# Patient Record
Sex: MALE | Race: WHITE | NOT HISPANIC OR LATINO | Employment: FULL TIME | URBAN - METROPOLITAN AREA
[De-identification: names, ages, dates, MRNs, and addresses within clinical notes are randomized per-mention and may not be internally consistent; named-entity substitution may affect disease eponyms.]

---

## 2018-04-02 ENCOUNTER — OFFICE VISIT (OUTPATIENT)
Dept: INTERNAL MEDICINE | Facility: CLINIC | Age: 28
End: 2018-04-02

## 2018-04-02 VITALS
DIASTOLIC BLOOD PRESSURE: 76 MMHG | TEMPERATURE: 98.7 F | OXYGEN SATURATION: 98 % | HEIGHT: 70 IN | BODY MASS INDEX: 28.35 KG/M2 | RESPIRATION RATE: 14 BRPM | SYSTOLIC BLOOD PRESSURE: 126 MMHG | WEIGHT: 198 LBS | HEART RATE: 90 BPM

## 2018-04-02 DIAGNOSIS — Z13.29 SCREENING FOR HYPOTHYROIDISM: ICD-10-CM

## 2018-04-02 DIAGNOSIS — R68.82 LOW LIBIDO: ICD-10-CM

## 2018-04-02 DIAGNOSIS — Z13.1 SCREENING FOR DIABETES MELLITUS: ICD-10-CM

## 2018-04-02 DIAGNOSIS — L28.0 LICHENIFICATION: ICD-10-CM

## 2018-04-02 DIAGNOSIS — L30.9 ECZEMA, UNSPECIFIED TYPE: Primary | ICD-10-CM

## 2018-04-02 DIAGNOSIS — F17.229 CHEWING TOBACCO NICOTINE DEPENDENCE WITH NICOTINE-INDUCED DISORDER: ICD-10-CM

## 2018-04-02 DIAGNOSIS — Z13.220 SCREENING FOR HYPERLIPIDEMIA: ICD-10-CM

## 2018-04-02 DIAGNOSIS — K13.21 LEUKOPLAKIA OF ORAL CAVITY: ICD-10-CM

## 2018-04-02 PROBLEM — M41.9 SCOLIOSIS: Status: ACTIVE | Noted: 2018-04-02

## 2018-04-02 PROCEDURE — 99215 OFFICE O/P EST HI 40 MIN: CPT | Performed by: INTERNAL MEDICINE

## 2018-04-02 RX ORDER — CLOTRIMAZOLE AND BETAMETHASONE DIPROPIONATE 10; .64 MG/G; MG/G
CREAM TOPICAL 2 TIMES DAILY
Qty: 45 G | Refills: 1 | Status: SHIPPED | OUTPATIENT
Start: 2018-04-02 | End: 2019-08-01

## 2018-04-02 RX ORDER — VARENICLINE TARTRATE 1 MG/1
1 TABLET, FILM COATED ORAL 2 TIMES DAILY
Qty: 60 TABLET | Refills: 3 | Status: SHIPPED | OUTPATIENT
Start: 2018-04-02 | End: 2018-06-04

## 2018-04-02 NOTE — PROGRESS NOTES
Brenton Parmar is a 27 y.o. male, who presents with a chief complaint of   Chief Complaint   Patient presents with   • Establish Care   • Rash     bilateral legs, 2 x years, patient states R leg worse        28 yo M here to establish care. All of his problems are new to me.    2 years ago developed a rash on his right medial leg that moved to his left leg. It is red and itchy.He has used lotion in a pump that did not help. Rubbing alcohol dried it up. He has not tried any creams. Worse in the summer. Worse wearing high boots with sweat.     He has been going to chiropractor for 1 year for low back pain and has leg numbness and pain in the left greater than the right. He does have scoliosis. He has episodes where it flairs and goes to chiropractor rand it's better. He has never gone to PT for his back. No injury to his back. No leg weakness. Taking Ibuprofen helps.     He also had low libido that has been going on for some time. No issues with erection or orgasms. No decrease in testicle size or penis size. He sleeps well. Works at [employer]. No significant caffeine or alcohol use. Overweight, but doesn't exercise regularly.    He has been using Snuff since he was 13 years old. He has changed in his mouth related to use. Wants to consider quitting. Getting  in one year and wants to quit before wedding.           The following portions of the patient's history were reviewed and updated as appropriate: allergies, current medications, past family history, past medical history, past social history, past surgical history and problem list.    Allergies: Review of patient's allergies indicates no known allergies.    Current Outpatient Prescriptions:   •  clotrimazole-betamethasone (LOTRISONE) 1-0.05 % cream, Apply  topically 2 (Two) Times a Day., Disp: 45 g, Rfl: 1  •  varenicline (CHANTIX CONTINUING MONTH PAK) 1 MG tablet, Take 1 tablet by mouth 2 (Two) Times a Day., Disp: 60 tablet, Rfl: 3  •  varenicline (CHANTIX  "STARTING MONTH PAK) 0.5 MG X 11 & 1 MG X 42 tablet, Take 0.5 mg one daily on days 1-3 and and 0.5 mg twice daily on days 4-7.Then 1 mg twice daily for a total of 12 weeks., Disp: 53 tablet, Rfl: 0  There are no discontinued medications.    Review of Systems   Constitutional: Negative for chills, fatigue and fever.   HENT: Positive for mouth sores (changes from dipping ). Negative for congestion.    Respiratory: Negative for cough and shortness of breath.    Cardiovascular: Negative for chest pain and palpitations.   Gastrointestinal: Negative for diarrhea, nausea and vomiting.   Genitourinary: Negative for difficulty urinating, dysuria and testicular pain.   Musculoskeletal: Positive for back pain.   Skin: Positive for rash.   Neurological: Negative for dizziness and headaches.   Psychiatric/Behavioral: Negative for agitation, behavioral problems and decreased concentration.             /76 (BP Location: Left arm, Patient Position: Sitting, Cuff Size: Adult)   Pulse 90   Temp 98.7 °F (37.1 °C) (Oral)   Resp 14   Ht 177.8 cm (70\")   Wt 89.8 kg (198 lb)   SpO2 98%   BMI 28.41 kg/m²       Physical Exam   Constitutional: He is oriented to person, place, and time. He appears well-developed and well-nourished. No distress.   HENT:   Head: Normocephalic and atraumatic.   Right Ear: External ear normal.   Left Ear: External ear normal.   Mouth/Throat: Oropharynx is clear and moist. Oral lesions (leukoplakia in left buccal mucosa) present. No oropharyngeal exudate.   Eyes: Conjunctivae are normal. Right eye exhibits no discharge. Left eye exhibits no discharge. No scleral icterus.   Neck: Neck supple.   Cardiovascular: Normal rate, regular rhythm and normal heart sounds.  Exam reveals no gallop and no friction rub.    No murmur heard.  Pulmonary/Chest: Effort normal and breath sounds normal. No respiratory distress. He has no wheezes. He has no rales.   Musculoskeletal: He exhibits no edema or tenderness. "   Lymphadenopathy:     He has no cervical adenopathy.   Neurological: He is alert and oriented to person, place, and time.   Skin: Skin is warm. Rash (eczema changes to right medial lower leg with lichenifcation) noted.   Psychiatric: He has a normal mood and affect. His behavior is normal.   Nursing note and vitals reviewed.        No results found for this or any previous visit.        Brenton was seen today for establish care and rash.    Diagnoses and all orders for this visit:    Eczema, unspecified type    Lichenification    Low libido  -     CBC & Differential    Screening for diabetes mellitus  -     Comprehensive Metabolic Panel  -     Urinalysis With / Culture If Indicated - Urine, Clean Catch    Screening for hyperlipidemia  -     Lipid Panel With LDL / HDL Ratio    Screening for hypothyroidism  -     TSH  -     T4, Free    Chewing tobacco nicotine dependence with nicotine-induced disorder    Leukoplakia of oral cavity    Other orders  -     varenicline (CHANTIX STARTING MONTH HU) 0.5 MG X 11 & 1 MG X 42 tablet; Take 0.5 mg one daily on days 1-3 and and 0.5 mg twice daily on days 4-7.Then 1 mg twice daily for a total of 12 weeks.  -     varenicline (CHANTIX CONTINUING MONTH HU) 1 MG tablet; Take 1 tablet by mouth 2 (Two) Times a Day.  -     clotrimazole-betamethasone (LOTRISONE) 1-0.05 % cream; Apply  topically 2 (Two) Times a Day.      Will treat Eczema with Lotrisone as it is getting worse with heat and warm weather to ensure that yeast is not worsening     Spent 5 minutes in counseling on dipping use and will start Chantix. See back for follow up in late May to see how he is doing. He has leukoplakia on his exam and encouraged him to stop dipping as he does have changes in his mouth that could lead to cancer. Not willing to see oral surgeon at this point, but if continues after quitting, will refer. Told him that I would only wait 6 months for this to resolve.      I want him to work on eating  better, exercising and quitting smoking before we pursue workup for low testosterone. If continues to be an issue, will check. No risks factors for low testosterone.     Spent 50% of 40 minutes in counseling regarding his low back pain, eczema, diet and exercise, eating better and changes in his mouth that are concerning.     Labs prior to physical and will get Tdap then.     Return in about 8 weeks (around 5/28/2018) for Annual physical.    Jyoti Souza MD  04/02/2018

## 2018-05-22 LAB
ALBUMIN SERPL-MCNC: 4.3 G/DL (ref 3.5–5.2)
ALBUMIN/GLOB SERPL: 1.8 G/DL
ALP SERPL-CCNC: 95 U/L (ref 40–129)
ALT SERPL-CCNC: 32 U/L (ref 5–41)
APPEARANCE UR: CLEAR
AST SERPL-CCNC: 24 U/L (ref 5–40)
BACTERIA #/AREA URNS HPF: NORMAL /HPF
BASOPHILS # BLD AUTO: 0.06 10*3/MM3 (ref 0–0.2)
BASOPHILS NFR BLD AUTO: 1 % (ref 0–2)
BILIRUB SERPL-MCNC: 0.2 MG/DL (ref 0.2–1.2)
BILIRUB UR QL STRIP: NEGATIVE
BUN SERPL-MCNC: 11 MG/DL (ref 6–20)
BUN/CREAT SERPL: 13.1 (ref 7–25)
CALCIUM SERPL-MCNC: 10.4 MG/DL (ref 8.6–10.5)
CHLORIDE SERPL-SCNC: 104 MMOL/L (ref 98–107)
CHOLEST SERPL-MCNC: 216 MG/DL (ref 0–200)
CO2 SERPL-SCNC: 27.8 MMOL/L (ref 22–29)
COLOR UR: YELLOW
CREAT SERPL-MCNC: 0.84 MG/DL (ref 0.76–1.27)
EOSINOPHIL # BLD AUTO: 0.45 10*3/MM3 (ref 0.1–0.3)
EOSINOPHIL NFR BLD AUTO: 7.3 % (ref 0–4)
EPI CELLS #/AREA URNS HPF: NORMAL /HPF
ERYTHROCYTE [DISTWIDTH] IN BLOOD BY AUTOMATED COUNT: 12.6 % (ref 11.5–14.5)
GFR SERPLBLD CREATININE-BSD FMLA CKD-EPI: 109 ML/MIN/1.73
GFR SERPLBLD CREATININE-BSD FMLA CKD-EPI: 132 ML/MIN/1.73
GLOBULIN SER CALC-MCNC: 2.4 GM/DL
GLUCOSE SERPL-MCNC: 107 MG/DL (ref 65–99)
GLUCOSE UR QL: NEGATIVE
HCT VFR BLD AUTO: 47.7 % (ref 42–52)
HDLC SERPL-MCNC: 47 MG/DL (ref 40–60)
HGB BLD-MCNC: 15.7 G/DL (ref 14–18)
HGB UR QL STRIP: NEGATIVE
IMM GRANULOCYTES # BLD: 0.02 10*3/MM3 (ref 0–0.03)
IMM GRANULOCYTES NFR BLD: 0.3 % (ref 0–0.5)
KETONES UR QL STRIP: NEGATIVE
LDLC SERPL CALC-MCNC: 145 MG/DL (ref 0–100)
LDLC/HDLC SERPL: 3.09 {RATIO}
LEUKOCYTE ESTERASE UR QL STRIP: NEGATIVE
LYMPHOCYTES # BLD AUTO: 2.49 10*3/MM3 (ref 0.6–4.8)
LYMPHOCYTES NFR BLD AUTO: 40.2 % (ref 20–45)
MCH RBC QN AUTO: 29.9 PG (ref 27–31)
MCHC RBC AUTO-ENTMCNC: 32.9 G/DL (ref 31–37)
MCV RBC AUTO: 90.9 FL (ref 80–94)
MICRO URNS: NORMAL
MICRO URNS: NORMAL
MONOCYTES # BLD AUTO: 0.51 10*3/MM3 (ref 0–1)
MONOCYTES NFR BLD AUTO: 8.2 % (ref 3–8)
MUCOUS THREADS URNS QL MICRO: PRESENT /HPF
NEUTROPHILS # BLD AUTO: 2.67 10*3/MM3 (ref 1.5–8.3)
NEUTROPHILS NFR BLD AUTO: 43 % (ref 45–70)
NITRITE UR QL STRIP: NEGATIVE
NRBC BLD AUTO-RTO: 0 /100 WBC (ref 0–0)
PH UR STRIP: 5.5 [PH] (ref 5–7.5)
PLATELET # BLD AUTO: 319 10*3/MM3 (ref 140–500)
POTASSIUM SERPL-SCNC: 5 MMOL/L (ref 3.5–5.2)
PROT SERPL-MCNC: 6.7 G/DL (ref 6–8.5)
PROT UR QL STRIP: NEGATIVE
RBC # BLD AUTO: 5.25 10*6/MM3 (ref 4.7–6.1)
RBC #/AREA URNS HPF: NORMAL /HPF
SODIUM SERPL-SCNC: 142 MMOL/L (ref 136–145)
SP GR UR: 1.02 (ref 1–1.03)
T4 FREE SERPL-MCNC: 1.2 NG/DL (ref 0.93–1.7)
TRIGL SERPL-MCNC: 119 MG/DL (ref 0–150)
TSH SERPL DL<=0.005 MIU/L-ACNC: 1.31 MIU/ML (ref 0.27–4.2)
URINALYSIS REFLEX: NORMAL
UROBILINOGEN UR STRIP-MCNC: 0.2 MG/DL (ref 0.2–1)
VLDLC SERPL CALC-MCNC: 23.8 MG/DL (ref 8–32)
WBC # BLD AUTO: 6.2 10*3/MM3 (ref 4.8–10.8)
WBC #/AREA URNS HPF: NORMAL /HPF

## 2018-06-04 ENCOUNTER — OFFICE VISIT (OUTPATIENT)
Dept: INTERNAL MEDICINE | Facility: CLINIC | Age: 28
End: 2018-06-04

## 2018-06-04 VITALS
SYSTOLIC BLOOD PRESSURE: 122 MMHG | OXYGEN SATURATION: 98 % | HEIGHT: 70 IN | BODY MASS INDEX: 28.75 KG/M2 | DIASTOLIC BLOOD PRESSURE: 78 MMHG | HEART RATE: 89 BPM | WEIGHT: 200.8 LBS | RESPIRATION RATE: 16 BRPM

## 2018-06-04 DIAGNOSIS — L30.9 ECZEMA, UNSPECIFIED TYPE: ICD-10-CM

## 2018-06-04 DIAGNOSIS — Z00.00 ENCOUNTER FOR ANNUAL PHYSICAL EXAM: Primary | ICD-10-CM

## 2018-06-04 DIAGNOSIS — R73.01 IFG (IMPAIRED FASTING GLUCOSE): ICD-10-CM

## 2018-06-04 DIAGNOSIS — E78.00 PURE HYPERCHOLESTEROLEMIA: ICD-10-CM

## 2018-06-04 DIAGNOSIS — L28.0 LICHENIFICATION: ICD-10-CM

## 2018-06-04 PROCEDURE — 93000 ELECTROCARDIOGRAM COMPLETE: CPT | Performed by: INTERNAL MEDICINE

## 2018-06-04 PROCEDURE — 99395 PREV VISIT EST AGE 18-39: CPT | Performed by: INTERNAL MEDICINE

## 2018-06-04 PROCEDURE — 90715 TDAP VACCINE 7 YRS/> IM: CPT | Performed by: INTERNAL MEDICINE

## 2018-06-04 PROCEDURE — 90471 IMMUNIZATION ADMIN: CPT | Performed by: INTERNAL MEDICINE

## 2018-06-04 PROCEDURE — 99213 OFFICE O/P EST LOW 20 MIN: CPT | Performed by: INTERNAL MEDICINE

## 2018-06-04 NOTE — PROGRESS NOTES
Patient Name: Brenton Farris is a 28 y.o. male presenting for Annual Exam (CPE,  lab results )    He has not dipped since May 5th and is doing well with this. He is eating sunflower seeds, candies, gums , etc that has helped. Took chantix for 4 weeks and then quit because he didn't need it anymore.     He has HLD that is new. He is not on medication and doesn't eat a good diet all the time.  Eats hamburgers regularly. Not exercising regularly either.     He has IFG and this is a new diagnosis. Doesn't drink sodas regularly. He doesn't watch what he eats. He does drink a few beers per night.     Well Adult Physical   Patient here for a comprehensive physical exam.The patient reports problems - IFG, HLD, chewing tobacco     Do you take any herbs or supplements that were not prescribed by a doctor? no   Are you taking calcium supplements? no   Are you taking aspirin daily? no      Brenton Parmar 28 y.o. male who presents for an Annual Wellness Visit.  he has a history of   Patient Active Problem List   Diagnosis   • Eczema   • Lichenification   • Low libido   • Leukoplakia of oral cavity   • Scoliosis       Health Habits:  Dental Exam. not up to date - needs to schedule   Eye Exam. not up to date - needs to schedule  Exercise: 0 times/week.  Current exercise activities include: not any formal exercise outside of work     Tob use: no longer dipping   Qualifies for lung Ca screening?N/A       The following portions of the patient's history were reviewed and updated as appropriate: allergies, current medications, past family history, past medical history, past social history, past surgical history and problem list.    Review of Systems   Constitutional: Negative for chills, fatigue and fever.   HENT: Negative for congestion.    Respiratory: Negative for cough and shortness of breath.    Cardiovascular: Negative for chest pain and palpitations.   Genitourinary: Negative for difficulty urinating and  "dysuria.   Musculoskeletal: Negative for arthralgias.   Skin: Positive for rash.   Neurological: Negative for headaches.   Hematological: Negative for adenopathy.       Patient has no known allergies.      Current Outpatient Prescriptions:   •  clotrimazole-betamethasone (LOTRISONE) 1-0.05 % cream, Apply  topically 2 (Two) Times a Day., Disp: 45 g, Rfl: 1    OBJECTIVE    /78 (BP Location: Left arm, Patient Position: Sitting, Cuff Size: Adult)   Pulse 89   Resp 16   Ht 177.8 cm (70\")   Wt 91.1 kg (200 lb 12.8 oz)   SpO2 98%   BMI 28.81 kg/m²       Physical Exam   Constitutional: He is oriented to person, place, and time. Vital signs are normal. He appears well-developed and well-nourished. No distress.   HENT:   Head: Normocephalic and atraumatic.   Right Ear: Hearing, tympanic membrane, external ear and ear canal normal.   Left Ear: Hearing, tympanic membrane, external ear and ear canal normal.   Nose: Nose normal.   Mouth/Throat: Uvula is midline and mucous membranes are normal. Oral lesions (leukoplakia on left lower mouth/buccal mucosa) present. Posterior oropharyngeal edema and posterior oropharyngeal erythema present. No oropharyngeal exudate. Tonsils are 1+ on the right. Tonsils are 1+ on the left. No tonsillar exudate.   Eyes: Conjunctivae, EOM and lids are normal. Pupils are equal, round, and reactive to light. Right eye exhibits no discharge. Left eye exhibits no discharge. No scleral icterus.   Neck: Trachea normal and full passive range of motion without pain. Neck supple. Carotid bruit is not present. No thyroid mass and no thyromegaly present.   Cardiovascular: Normal rate, regular rhythm and normal heart sounds.  Exam reveals no gallop and no friction rub.    No murmur heard.  Pulmonary/Chest: Effort normal and breath sounds normal. No respiratory distress. He has no wheezes. He has no rales.   Abdominal: Soft. Bowel sounds are normal. He exhibits no distension and no mass. There is no " tenderness. There is no guarding.   Lymphadenopathy:     He has no cervical adenopathy.   Neurological: He is alert and oriented to person, place, and time.   Skin: Skin is warm. Rash noted. Rash is maculopapular (red and raised area of right inner leg with lichenfication ).   Psychiatric: He has a normal mood and affect. His behavior is normal.   Nursing note and vitals reviewed.        ECG 12 Lead  Date/Time: 6/4/2018 2:00 PM  Performed by: CAIO KIM  Authorized by: CAIO KIM   Comparison: not compared with previous ECG   Previous ECG: no previous ECG available  Rhythm: sinus rhythm  Rate: normal  Conduction: conduction normal  Conduction: non-specific intraventricular conduction delay  ST Segments: ST segments normal  T Waves: T waves normal  QRS axis: normal  Other: no other findings  Clinical impression: normal ECG          ASSESSMENT AND PLAN  Tdap given today.I want Brenton to begin a progressive daily aerobic exercise program, follow a low fat, low cholesterol diet, attempt to lose weight, decrease or avoid alcohol intake, reduce exposure to stress and improve dietary compliance.     He has new diagnosis of IFG and HLD and we discussed at length about diet and exercise and spent 50% of 25 minutes in counseling regarding this outside of physical.     He still has rash on his left leg and was not better with Lotrisone. Will send to dermatology for further evaluation as he has had this rash for the last 2 years.     Will see back with fasting labs one week before in 10/2018    Brenton was seen today for annual exam.    Diagnoses and all orders for this visit:    Encounter for annual physical exam  -     ECG 12 Lead  -     Tdap Vaccine Greater Than or Equal To 6yo IM    IFG (impaired fasting glucose)  -     Comprehensive Metabolic Panel  -     Hemoglobin A1c    Pure hypercholesterolemia  -     ECG 12 Lead  -     Comprehensive Metabolic Panel  -     Lipid Panel With LDL / HDL Ratio    Eczema,  unspecified type  -     Ambulatory Referral to Dermatology    Lichenification  -     Ambulatory Referral to Dermatology        Return in about 4 months (around 10/15/2018) for Recheck.

## 2018-06-04 NOTE — PATIENT INSTRUCTIONS
Cholesterol  Cholesterol is a fat. Your body needs a small amount of cholesterol. Cholesterol (plaque) may build up in your blood vessels (arteries). That makes you more likely to have a heart attack or stroke.  You cannot feel your cholesterol level. Having a blood test is the only way to find out if your level is high. Keep your test results. Work with your doctor to keep your cholesterol at a good level.  What do the results mean?  · Total cholesterol is how much cholesterol is in your blood.  · LDL is bad cholesterol. This is the type that can build up. Try to have low LDL.  · HDL is good cholesterol. It cleans your blood vessels and carries LDL away. Try to have high HDL.  · Triglycerides are fat that the body can store or burn for energy.  What are good levels of cholesterol?  · Total cholesterol below 200.  · LDL below 100 is good for people who have health risks. LDL below 70 is good for people who have very high risks.  · HDL above 40 is good. It is best to have HDL of 60 or higher.  · Triglycerides below 150.  How can I lower my cholesterol?  Diet   Follow your diet program as told by your doctor.  · Choose fish, white meat chicken, or turkey that is roasted or baked. Try not to eat red meat, fried foods, sausage, or lunch meats.  · Eat lots of fresh fruits and vegetables.  · Choose whole grains, beans, pasta, potatoes, and cereals.  · Choose olive oil, corn oil, or canola oil. Only use small amounts.  · Try not to eat butter, mayonnaise, shortening, or palm kernel oils.  · Try not to eat foods with trans fats.  · Choose low-fat or nonfat dairy foods.  ¨ Drink skim or nonfat milk.  ¨ Eat low-fat or nonfat yogurt and cheeses.  ¨ Try not to drink whole milk or cream.  ¨ Try not to eat ice cream, egg yolks, or full-fat cheeses.  · Healthy desserts include manfred food cake, michael snaps, animal crackers, hard candy, popsicles, and low-fat or nonfat frozen yogurt. Try not to eat pastries, cakes, pies, and  cookies.  Exercise   Follow your exercise program as told by your doctor.  · Be more active. Try gardening, walking, and taking the stairs.  · Ask your doctor about ways that you can be more active.  Medicine  · Take over-the-counter and prescription medicines only as told by your doctor.  This information is not intended to replace advice given to you by your health care provider. Make sure you discuss any questions you have with your health care provider.  Document Released: 03/16/2010 Document Revised: 07/19/2017 Document Reviewed: 06/29/2017  Elsevier Interactive Patient Education © 2017 [employer]

## 2019-08-01 ENCOUNTER — OFFICE VISIT (OUTPATIENT)
Dept: INTERNAL MEDICINE | Facility: CLINIC | Age: 29
End: 2019-08-01

## 2019-08-01 VITALS
OXYGEN SATURATION: 96 % | HEART RATE: 79 BPM | RESPIRATION RATE: 18 BRPM | WEIGHT: 200.2 LBS | SYSTOLIC BLOOD PRESSURE: 128 MMHG | BODY MASS INDEX: 28.66 KG/M2 | DIASTOLIC BLOOD PRESSURE: 70 MMHG | HEIGHT: 70 IN | TEMPERATURE: 98.1 F

## 2019-08-01 DIAGNOSIS — E78.49 OTHER HYPERLIPIDEMIA: ICD-10-CM

## 2019-08-01 DIAGNOSIS — K13.21 LEUKOPLAKIA OF ORAL CAVITY: Primary | ICD-10-CM

## 2019-08-01 DIAGNOSIS — Z13.29 SCREENING FOR HYPOTHYROIDISM: ICD-10-CM

## 2019-08-01 DIAGNOSIS — F17.229 CHEWING TOBACCO NICOTINE DEPENDENCE WITH NICOTINE-INDUCED DISORDER: ICD-10-CM

## 2019-08-01 DIAGNOSIS — R73.01 IFG (IMPAIRED FASTING GLUCOSE): ICD-10-CM

## 2019-08-01 PROCEDURE — 99214 OFFICE O/P EST MOD 30 MIN: CPT | Performed by: INTERNAL MEDICINE

## 2019-08-01 NOTE — PROGRESS NOTES
"Brenton Parmar is a 29 y.o. male, who presents with a chief complaint of   Chief Complaint   Patient presents with   • Hyperlipidemia       28 yo M here for follow up. He is still dipping and still has issues with lesions in his mouth. Has not seen dentist, but has appointment with them soon.     He is still poorly on most days. He eats ramen noodle on most days. He does have lean protein on most days. Limited fruits and vegetables. Not drinking soda or sweet tea. He drinks about 3-4 beers/day.       He still has lesion on his right lower legs. Steroid has helped some. He has appointment with dermatology on 8/18/19.     He does elevated BP's at home. No symptoms. Eats a high salt diet and drinks alcohol. No headaches or dizziness.          The following portions of the patient's history were reviewed and updated as appropriate: allergies, current medications, past family history, past medical history, past social history, past surgical history and problem list.    Allergies: Patient has no known allergies.  No current outpatient medications on file.  Medications Discontinued During This Encounter   Medication Reason   • clotrimazole-betamethasone (LOTRISONE) 1-0.05 % cream *Therapy completed       Review of Systems   Constitutional: Negative for chills and fatigue.   HENT: Negative for congestion and rhinorrhea.    Respiratory: Negative for cough and shortness of breath.    Cardiovascular: Negative for chest pain and palpitations.   Gastrointestinal: Negative for abdominal pain, constipation and diarrhea.   Neurological: Negative for dizziness and headaches.             /70   Pulse 79   Temp 98.1 °F (36.7 °C) (Oral)   Resp 18   Ht 177.8 cm (70\")   Wt 90.8 kg (200 lb 3.2 oz)   SpO2 96%   BMI 28.73 kg/m²       Physical Exam   Constitutional: He is oriented to person, place, and time. He appears well-developed and well-nourished. No distress.   HENT:   Head: Normocephalic and atraumatic.   Right Ear: " External ear normal.   Left Ear: External ear normal.   Mouth/Throat: Oropharynx is clear and moist. No oropharyngeal exudate.   Eyes: Conjunctivae are normal. Right eye exhibits no discharge. Left eye exhibits no discharge. No scleral icterus.   Neck: Neck supple.   Cardiovascular: Normal rate, regular rhythm and normal heart sounds. Exam reveals no gallop and no friction rub.   No murmur heard.  Pulmonary/Chest: Effort normal and breath sounds normal. No respiratory distress. He has no wheezes. He has no rales.   Abdominal: Soft. Bowel sounds are normal. He exhibits no distension and no mass. There is no tenderness. There is no guarding.   Lymphadenopathy:     He has no cervical adenopathy.   Neurological: He is alert and oriented to person, place, and time.   Skin: Skin is warm. No rash noted.   Psychiatric: He has a normal mood and affect. His behavior is normal.   Nursing note and vitals reviewed.        Results for orders placed or performed in visit on 04/02/18   Comprehensive Metabolic Panel   Result Value Ref Range    Glucose 107 (H) 65 - 99 mg/dL    BUN 11 6 - 20 mg/dL    Creatinine 0.84 0.76 - 1.27 mg/dL    eGFR Non African Am 109 >60 mL/min/1.73    eGFR African Am 132 >60 mL/min/1.73    BUN/Creatinine Ratio 13.1 7.0 - 25.0    Sodium 142 136 - 145 mmol/L    Potassium 5.0 3.5 - 5.2 mmol/L    Chloride 104 98 - 107 mmol/L    Total CO2 27.8 22.0 - 29.0 mmol/L    Calcium 10.4 8.6 - 10.5 mg/dL    Total Protein 6.7 6.0 - 8.5 g/dL    Albumin 4.30 3.50 - 5.20 g/dL    Globulin 2.4 gm/dL    A/G Ratio 1.8 g/dL    Total Bilirubin 0.2 0.2 - 1.2 mg/dL    Alkaline Phosphatase 95 40 - 129 U/L    AST (SGOT) 24 5 - 40 U/L    ALT (SGPT) 32 5 - 41 U/L   Lipid Panel With LDL / HDL Ratio   Result Value Ref Range    Total Cholesterol 216 (H) 0 - 200 mg/dL    Triglycerides 119 0 - 150 mg/dL    HDL Cholesterol 47 40 - 60 mg/dL    VLDL Cholesterol 23.8 8 - 32 mg/dL    LDL Cholesterol  145 (H) 0 - 100 mg/dL    LDL/HDL Ratio 3.09     Urinalysis With / Culture If Indicated - Urine, Clean Catch   Result Value Ref Range    Specific Gravity, UA 1.021 1.005 - 1.030    pH, UA 5.5 5.0 - 7.5    Color, UA Yellow Yellow    Appearance, UA Clear Clear    Leukocytes, UA Negative Negative    Protein Negative Negative/Trace    Glucose, UA Negative Negative    Ketones Negative Negative    Blood, UA Negative Negative    Bilirubin, UA Negative Negative    Urobilinogen, UA 0.2 0.2 - 1.0 mg/dL    Nitrite, UA Negative Negative    Microscopic Examination Comment     Microscopic Examination See below:     Urinalysis Reflex Comment    TSH   Result Value Ref Range    TSH 1.310 0.270 - 4.200 mIU/mL   T4, Free   Result Value Ref Range    Free T4 1.20 0.93 - 1.70 ng/dL   Microscopic Examination   Result Value Ref Range    WBC, UA 0-5 0 - 5 /hpf    RBC, UA None seen 0 - 2 /hpf    Epithelial Cells (non renal) 0-10 0 - 10 /hpf    Mucus, UA Present Not Estab. /hpf    Bacteria, UA None seen None seen/Few /hpf   CBC & Differential   Result Value Ref Range    WBC 6.20 4.80 - 10.80 10*3/mm3    RBC 5.25 4.70 - 6.10 10*6/mm3    Hemoglobin 15.7 14.0 - 18.0 g/dL    Hematocrit 47.7 42.0 - 52.0 %    MCV 90.9 80.0 - 94.0 fL    MCH 29.9 27.0 - 31.0 pg    MCHC 32.9 31.0 - 37.0 g/dL    RDW 12.6 11.5 - 14.5 %    Platelets 319 140 - 500 10*3/mm3    Neutrophil Rel % 43.0 (L) 45.0 - 70.0 %    Lymphocyte Rel % 40.2 20.0 - 45.0 %    Monocyte Rel % 8.2 (H) 3.0 - 8.0 %    Eosinophil Rel % 7.3 (H) 0.0 - 4.0 %    Basophil Rel % 1.0 0.0 - 2.0 %    Neutrophils Absolute 2.67 1.50 - 8.30 10*3/mm3    Lymphocytes Absolute 2.49 0.60 - 4.80 10*3/mm3    Monocytes Absolute 0.51 0.00 - 1.00 10*3/mm3    Eosinophils Absolute 0.45 (H) 0.10 - 0.30 10*3/mm3    Basophils Absolute 0.06 0.00 - 0.20 10*3/mm3    Immature Granulocyte Rel % 0.3 0.0 - 0.5 %    Immature Grans Absolute 0.02 0.00 - 0.03 10*3/mm3    nRBC 0.0 0.0 - 0.0 /100 WBC           Brenton was seen today for hyperlipidemia.    Diagnoses and all orders  for this visit:    Leukoplakia of oral cavity    Other hyperlipidemia  -     CBC & Differential  -     Comprehensive Metabolic Panel  -     Lipid Panel With LDL / HDL Ratio    IFG (impaired fasting glucose)  -     Comprehensive Metabolic Panel  -     Hemoglobin A1c    Chewing tobacco nicotine dependence with nicotine-induced disorder    Screening for hypothyroidism  -     TSH Rfx On Abnormal To Free T4      Seeing dentist for his mouth. I am worried about his dipping.     Will check labs and call with results. Still needs to work really hard on his diet and exercise.     He is not sure about quitting dipping. He doesn't have a set date that he wants to quit, but is doing to think about it.     Stressed importance of compliance and working on getting healthier. Spent greater than 50% of 25 minutes in counseling regarding dipping, HLD and his diet.     Return in about 6 months (around 2/1/2020) for Recheck.    Jyoti Souza MD  08/01/2019

## 2019-08-02 LAB
ALBUMIN SERPL-MCNC: 4.7 G/DL (ref 3.5–5.2)
ALBUMIN/GLOB SERPL: 1.6 G/DL
ALP SERPL-CCNC: 92 U/L (ref 39–117)
ALT SERPL-CCNC: 39 U/L (ref 1–41)
AST SERPL-CCNC: 30 U/L (ref 1–40)
BASOPHILS # BLD AUTO: 0.04 10*3/MM3 (ref 0–0.2)
BASOPHILS NFR BLD AUTO: 0.7 % (ref 0–1.5)
BILIRUB SERPL-MCNC: 0.3 MG/DL (ref 0.2–1.2)
BUN SERPL-MCNC: 11 MG/DL (ref 6–20)
BUN/CREAT SERPL: 13.3 (ref 7–25)
CALCIUM SERPL-MCNC: 9.8 MG/DL (ref 8.6–10.5)
CHLORIDE SERPL-SCNC: 106 MMOL/L (ref 98–107)
CHOLEST SERPL-MCNC: 228 MG/DL (ref 0–200)
CO2 SERPL-SCNC: 27.7 MMOL/L (ref 22–29)
CREAT SERPL-MCNC: 0.83 MG/DL (ref 0.76–1.27)
EOSINOPHIL # BLD AUTO: 0.29 10*3/MM3 (ref 0–0.4)
EOSINOPHIL NFR BLD AUTO: 4.9 % (ref 0.3–6.2)
ERYTHROCYTE [DISTWIDTH] IN BLOOD BY AUTOMATED COUNT: 13.2 % (ref 12.3–15.4)
GLOBULIN SER CALC-MCNC: 2.9 GM/DL
GLUCOSE SERPL-MCNC: 99 MG/DL (ref 65–99)
HBA1C MFR BLD: 5.6 % (ref 4.8–5.6)
HCT VFR BLD AUTO: 50.3 % (ref 37.5–51)
HDLC SERPL-MCNC: 56 MG/DL (ref 40–60)
HGB BLD-MCNC: 16.1 G/DL (ref 13–17.7)
IMM GRANULOCYTES # BLD AUTO: 0.02 10*3/MM3 (ref 0–0.05)
IMM GRANULOCYTES NFR BLD AUTO: 0.3 % (ref 0–0.5)
LDLC SERPL CALC-MCNC: 157 MG/DL (ref 0–100)
LDLC/HDLC SERPL: 2.81 {RATIO}
LYMPHOCYTES # BLD AUTO: 1.75 10*3/MM3 (ref 0.7–3.1)
LYMPHOCYTES NFR BLD AUTO: 29.7 % (ref 19.6–45.3)
MCH RBC QN AUTO: 29.5 PG (ref 26.6–33)
MCHC RBC AUTO-ENTMCNC: 32 G/DL (ref 31.5–35.7)
MCV RBC AUTO: 92.3 FL (ref 79–97)
MONOCYTES # BLD AUTO: 0.48 10*3/MM3 (ref 0.1–0.9)
MONOCYTES NFR BLD AUTO: 8.1 % (ref 5–12)
NEUTROPHILS # BLD AUTO: 3.32 10*3/MM3 (ref 1.7–7)
NEUTROPHILS NFR BLD AUTO: 56.3 % (ref 42.7–76)
NRBC BLD AUTO-RTO: 0 /100 WBC (ref 0–0.2)
PLATELET # BLD AUTO: 328 10*3/MM3 (ref 140–450)
POTASSIUM SERPL-SCNC: 4.8 MMOL/L (ref 3.5–5.2)
PROT SERPL-MCNC: 7.6 G/DL (ref 6–8.5)
RBC # BLD AUTO: 5.45 10*6/MM3 (ref 4.14–5.8)
SODIUM SERPL-SCNC: 145 MMOL/L (ref 136–145)
TRIGL SERPL-MCNC: 73 MG/DL (ref 0–150)
TSH SERPL DL<=0.005 MIU/L-ACNC: 0.68 MIU/ML (ref 0.27–4.2)
VLDLC SERPL CALC-MCNC: 14.6 MG/DL
WBC # BLD AUTO: 5.9 10*3/MM3 (ref 3.4–10.8)

## 2019-08-02 NOTE — PROGRESS NOTES
Cholesterol is still very high and has gone up another 10 points compared to last year. He needs to get serious about diet and exercise. Blood sugars are also borderline still. He is 0.1% away from being pre-diabetic at 29! I would like to see him back in 6 months with blood work one week before. Please arrange follow up over phone.

## 2019-08-07 ENCOUNTER — TELEPHONE (OUTPATIENT)
Dept: INTERNAL MEDICINE | Facility: CLINIC | Age: 29
End: 2019-08-07

## 2019-08-07 NOTE — TELEPHONE ENCOUNTER
----- Message from Jyoti Souza MD sent at 8/2/2019 10:22 AM EDT -----  Cholesterol is still very high and has gone up another 10 points compared to last year. He needs to get serious about diet and exercise. Blood sugars are also borderline still. He is 0.1% away from being pre-diabetic at 29! I would like to see him back in 6 months with blood work one week before. Please arrange follow up over phone.

## 2020-10-30 ENCOUNTER — OFFICE VISIT (OUTPATIENT)
Dept: INTERNAL MEDICINE | Facility: CLINIC | Age: 30
End: 2020-10-30

## 2020-10-30 VITALS
TEMPERATURE: 97.5 F | OXYGEN SATURATION: 99 % | WEIGHT: 183 LBS | DIASTOLIC BLOOD PRESSURE: 62 MMHG | RESPIRATION RATE: 16 BRPM | BODY MASS INDEX: 25.62 KG/M2 | HEIGHT: 71 IN | HEART RATE: 62 BPM | SYSTOLIC BLOOD PRESSURE: 104 MMHG

## 2020-10-30 DIAGNOSIS — Z23 NEED FOR INFLUENZA VACCINATION: ICD-10-CM

## 2020-10-30 DIAGNOSIS — Z00.00 ROUTINE HEALTH MAINTENANCE: ICD-10-CM

## 2020-10-30 DIAGNOSIS — S39.012A STRAIN OF LUMBAR REGION, INITIAL ENCOUNTER: Primary | ICD-10-CM

## 2020-10-30 PROBLEM — M41.9 SCOLIOSIS: Status: RESOLVED | Noted: 2018-04-02 | Resolved: 2020-10-30

## 2020-10-30 PROBLEM — L28.0 LICHENIFICATION: Status: RESOLVED | Noted: 2018-04-02 | Resolved: 2020-10-30

## 2020-10-30 PROBLEM — L30.9 ECZEMA: Status: RESOLVED | Noted: 2018-04-02 | Resolved: 2020-10-30

## 2020-10-30 PROBLEM — R68.82 LOW LIBIDO: Status: RESOLVED | Noted: 2018-04-02 | Resolved: 2020-10-30

## 2020-10-30 PROBLEM — K13.21 LEUKOPLAKIA OF ORAL CAVITY: Status: RESOLVED | Noted: 2018-04-02 | Resolved: 2020-10-30

## 2020-10-30 PROCEDURE — 90686 IIV4 VACC NO PRSV 0.5 ML IM: CPT | Performed by: NURSE PRACTITIONER

## 2020-10-30 PROCEDURE — 99213 OFFICE O/P EST LOW 20 MIN: CPT | Performed by: NURSE PRACTITIONER

## 2020-10-30 PROCEDURE — 90471 IMMUNIZATION ADMIN: CPT | Performed by: NURSE PRACTITIONER

## 2020-10-30 RX ORDER — METHOCARBAMOL 500 MG/1
500 TABLET, FILM COATED ORAL 4 TIMES DAILY PRN
Qty: 40 TABLET | Refills: 0 | Status: SHIPPED | OUTPATIENT
Start: 2020-10-30 | End: 2020-11-13

## 2020-10-30 RX ORDER — PREDNISONE 50 MG/1
50 TABLET ORAL DAILY
Qty: 5 TABLET | Refills: 0 | Status: SHIPPED | OUTPATIENT
Start: 2020-10-30 | End: 2020-11-04

## 2020-10-30 NOTE — PROGRESS NOTES
Subjective    Brenton Parmar is a 30 y.o. male presenting today for   Chief Complaint   Patient presents with   • Establish Care   • Back Pain       History of Present Illness     Brenton Parmar presents today as a new patient to me to establish care.   Prior PCP was Jyoti Souza.  Patient Care Team:  Leslie Giraldo APRN as PCP - General (Family Medicine)    Current/chronic health conditions include:    Patient Active Problem List   Diagnosis   • Other hyperlipidemia   • Chewing tobacco nicotine dependence with nicotine-induced disorder   • IFG (impaired fasting glucose)         Current Outpatient Medications:   •  methocarbamol (Robaxin) 500 MG tablet, Take 1 tablet by mouth 4 (Four) Times a Day As Needed for Muscle Spasms for up to 14 days., Disp: 40 tablet, Rfl: 0  •  predniSONE (DELTASONE) 50 MG tablet, Take 1 tablet by mouth Daily for 5 days., Disp: 5 tablet, Rfl: 0        New complaints today include:  LBP. The onset of this was 3-4 weeks ago. His job is very physical and this seemed to be the inciting issue. He has been going to the chiro once a week and taking PRN Ibuprofen. S&S are improving but not at the rate he would anticipate. He plans to have x-rays done later today through his chiro. There is radiation into the R gluteal area and the anterior of the R thigh. No disruptions in bowel or bladder functions      The following portions of the patient's history were reviewed and updated as appropriate: allergies, current medications, problem list, past medical history, past surgical history, family history, and social history.     Review of Systems   Constitutional: Negative.    HENT: Negative.    Eyes: Negative.    Respiratory: Negative.    Cardiovascular: Negative.    Gastrointestinal: Negative.    Genitourinary: Negative.    Musculoskeletal: Positive for back pain.   Skin: Negative.    Neurological: Negative.  Negative for weakness and numbness.   Psychiatric/Behavioral: Negative.   "      Objective    Vitals:    10/30/20 1024   BP: 104/62   BP Location: Left arm   Patient Position: Sitting   Cuff Size: Adult   Pulse: 62   Resp: 16   Temp: 97.5 °F (36.4 °C)   TempSrc: Temporal   SpO2: 99%   Weight: 83 kg (183 lb)   Height: 179.1 cm (70.5\")     Body mass index is 25.89 kg/m².  Nursing notes and vitals reviewed.    Physical Exam  Constitutional:       General: He is not in acute distress.     Appearance: He is well-developed.   Cardiovascular:      Rate and Rhythm: Regular rhythm.      Heart sounds: Normal heart sounds.   Pulmonary:      Effort: Pulmonary effort is normal.      Breath sounds: Normal breath sounds.   Musculoskeletal:      Lumbar back: He exhibits normal range of motion, no bony tenderness and no deformity.   Neurological:      General: No focal deficit present.      Mental Status: He is alert and oriented to person, place, and time.      Sensory: Sensation is intact.      Motor: Motor function is intact.      Coordination: Coordination is intact.      Gait: Gait is intact.      Deep Tendon Reflexes:      Reflex Scores:       Patellar reflexes are 2+ on the right side and 2+ on the left side.  Psychiatric:         Attention and Perception: He is attentive.         Mood and Affect: Mood and affect normal.         Speech: Speech normal.         Behavior: Behavior normal.         Thought Content: Thought content normal.         No results found for this or any previous visit (from the past 672 hour(s)).      Assessment and Plan    Diagnoses and all orders for this visit:    1. Strain of lumbar region, initial encounter (Primary)  -     predniSONE (DELTASONE) 50 MG tablet; Take 1 tablet by mouth Daily for 5 days.  Dispense: 5 tablet; Refill: 0  -     methocarbamol (Robaxin) 500 MG tablet; Take 1 tablet by mouth 4 (Four) Times a Day As Needed for Muscle Spasms for up to 14 days.  Dispense: 40 tablet; Refill: 0    2. Routine health maintenance  -     CBC & Differential  -     " Comprehensive Metabolic Panel  -     Hepatitis C Antibody  -     Lipid Panel With / Chol / HDL Ratio  -     Thyroid Cascade Profile  -     Vitamin D 25 Hydroxy    3. Need for influenza vaccination  -     Fluarix/Fluzone/Afluria Quad>6 Months              Medications, including side effects, were discussed with the patient. Patient verbalized understanding.  The plan of care was discussed. All questions were answered. Patient verbalized understanding.        Return in about 1 week (around 11/6/2020) for Annual physical.

## 2020-10-31 LAB
25(OH)D3+25(OH)D2 SERPL-MCNC: 34.8 NG/ML (ref 30–100)
ALBUMIN SERPL-MCNC: 4.7 G/DL (ref 3.5–5.2)
ALBUMIN/GLOB SERPL: 2 G/DL
ALP SERPL-CCNC: 93 U/L (ref 39–117)
ALT SERPL-CCNC: 23 U/L (ref 1–41)
AST SERPL-CCNC: 19 U/L (ref 1–40)
BASOPHILS # BLD AUTO: 0.06 10*3/MM3 (ref 0–0.2)
BASOPHILS NFR BLD AUTO: 1.1 % (ref 0–1.5)
BILIRUB SERPL-MCNC: <0.2 MG/DL (ref 0–1.2)
BUN SERPL-MCNC: 12 MG/DL (ref 6–20)
BUN/CREAT SERPL: 14.1 (ref 7–25)
CALCIUM SERPL-MCNC: 9.9 MG/DL (ref 8.6–10.5)
CHLORIDE SERPL-SCNC: 104 MMOL/L (ref 98–107)
CHOLEST SERPL-MCNC: 245 MG/DL (ref 0–200)
CHOLEST/HDLC SERPL: 3.4 {RATIO}
CO2 SERPL-SCNC: 25.2 MMOL/L (ref 22–29)
CREAT SERPL-MCNC: 0.85 MG/DL (ref 0.76–1.27)
EOSINOPHIL # BLD AUTO: 0.23 10*3/MM3 (ref 0–0.4)
EOSINOPHIL NFR BLD AUTO: 4.4 % (ref 0.3–6.2)
ERYTHROCYTE [DISTWIDTH] IN BLOOD BY AUTOMATED COUNT: 12.5 % (ref 12.3–15.4)
GLOBULIN SER CALC-MCNC: 2.4 GM/DL
GLUCOSE SERPL-MCNC: 81 MG/DL (ref 65–99)
HCT VFR BLD AUTO: 48.5 % (ref 37.5–51)
HCV AB S/CO SERPL IA: <0.1 S/CO RATIO (ref 0–0.9)
HDLC SERPL-MCNC: 72 MG/DL (ref 40–60)
HGB BLD-MCNC: 16.3 G/DL (ref 13–17.7)
IMM GRANULOCYTES # BLD AUTO: 0.02 10*3/MM3 (ref 0–0.05)
IMM GRANULOCYTES NFR BLD AUTO: 0.4 % (ref 0–0.5)
INTERPRETATION: NORMAL
LDLC SERPL CALC-MCNC: 157 MG/DL (ref 0–100)
LYMPHOCYTES # BLD AUTO: 1.26 10*3/MM3 (ref 0.7–3.1)
LYMPHOCYTES NFR BLD AUTO: 24 % (ref 19.6–45.3)
MCH RBC QN AUTO: 30.4 PG (ref 26.6–33)
MCHC RBC AUTO-ENTMCNC: 33.6 G/DL (ref 31.5–35.7)
MCV RBC AUTO: 90.5 FL (ref 79–97)
MONOCYTES # BLD AUTO: 0.49 10*3/MM3 (ref 0.1–0.9)
MONOCYTES NFR BLD AUTO: 9.3 % (ref 5–12)
NEUTROPHILS # BLD AUTO: 3.19 10*3/MM3 (ref 1.7–7)
NEUTROPHILS NFR BLD AUTO: 60.8 % (ref 42.7–76)
NRBC BLD AUTO-RTO: 0 /100 WBC (ref 0–0.2)
PLATELET # BLD AUTO: 404 10*3/MM3 (ref 140–450)
POTASSIUM SERPL-SCNC: 4.5 MMOL/L (ref 3.5–5.2)
PROT SERPL-MCNC: 7.1 G/DL (ref 6–8.5)
RBC # BLD AUTO: 5.36 10*6/MM3 (ref 4.14–5.8)
SODIUM SERPL-SCNC: 141 MMOL/L (ref 136–145)
T3FREE SERPL-MCNC: 3.4 PG/ML (ref 2–4.4)
T4 FREE SERPL-MCNC: 1.2 NG/DL (ref 0.82–1.77)
TRIGL SERPL-MCNC: 92 MG/DL (ref 0–150)
TSH SERPL DL<=0.005 MIU/L-ACNC: 0.43 UIU/ML (ref 0.45–4.5)
VLDLC SERPL CALC-MCNC: 16 MG/DL (ref 5–40)
WBC # BLD AUTO: 5.25 10*3/MM3 (ref 3.4–10.8)

## 2020-11-06 ENCOUNTER — OFFICE VISIT (OUTPATIENT)
Dept: INTERNAL MEDICINE | Facility: CLINIC | Age: 30
End: 2020-11-06

## 2020-11-06 VITALS
OXYGEN SATURATION: 98 % | BODY MASS INDEX: 26.18 KG/M2 | TEMPERATURE: 97.1 F | HEART RATE: 77 BPM | WEIGHT: 187 LBS | SYSTOLIC BLOOD PRESSURE: 110 MMHG | DIASTOLIC BLOOD PRESSURE: 70 MMHG | RESPIRATION RATE: 16 BRPM | HEIGHT: 71 IN

## 2020-11-06 DIAGNOSIS — R79.89 ABNORMAL TSH: ICD-10-CM

## 2020-11-06 DIAGNOSIS — Z00.00 ENCOUNTER FOR WELLNESS EXAMINATION IN ADULT: Primary | ICD-10-CM

## 2020-11-06 PROCEDURE — 99395 PREV VISIT EST AGE 18-39: CPT | Performed by: NURSE PRACTITIONER

## 2020-11-06 NOTE — PROGRESS NOTES
"LISANDRA Farris is a 30 y.o. male presenting for Annual Exam    His current/chronic health conditions include:  Patient Active Problem List   Diagnosis   • Other hyperlipidemia   • Chewing tobacco nicotine dependence with nicotine-induced disorder   • IFG (impaired fasting glucose)       Current Outpatient Medications on File Prior to Visit   Medication Sig   • methocarbamol (Robaxin) 500 MG tablet Take 1 tablet by mouth 4 (Four) Times a Day As Needed for Muscle Spasms for up to 14 days.     No current facility-administered medications on file prior to visit.             Health Habits:  Dental Exam: NUTD  Eye Exam: NUTD  Exercise: 7 times/week.  Current exercise activities include: His work is very physically active.    Screenings:  PSA: n/a  Colonoscopy: n/a  Tob use: current, chew      Complaints today include:      The patient's allergies, current medications, problem list, past medical history, past family history, past medical history, and past social history were reviewed and updated as appropriate.    Review of Systems   Constitutional: Negative.    HENT: Negative.    Eyes: Negative.    Respiratory: Negative.    Cardiovascular: Negative.    Gastrointestinal: Negative.    Endocrine: Negative.    Genitourinary: Negative.    Musculoskeletal: Positive for back pain.   Skin: Negative.    Allergic/Immunologic: Negative.    Neurological: Negative.    Hematological: Negative.    Psychiatric/Behavioral: Negative.        OBJECTIVE    /70 (BP Location: Left arm, Patient Position: Sitting, Cuff Size: Adult)   Pulse 77   Temp 97.1 °F (36.2 °C) (Temporal)   Resp 16   Ht 179.1 cm (70.51\")   Wt 84.8 kg (187 lb)   SpO2 98%   BMI 26.44 kg/m²   Body mass index is 26.44 kg/m².  Nursing notes and vital signs reviewed.    Physical Exam  Constitutional:       General: He is not in acute distress.     Appearance: Normal appearance. He is well-developed.   HENT:      Head: Normocephalic.      Right Ear: Hearing, " tympanic membrane, ear canal and external ear normal.      Left Ear: Hearing, tympanic membrane, ear canal and external ear normal.      Nose: Nose normal. No mucosal edema or rhinorrhea.      Mouth/Throat:      Mouth: Mucous membranes are moist.      Pharynx: Oropharynx is clear. Uvula midline.   Eyes:      General: Lids are normal.      Extraocular Movements: Extraocular movements intact.      Conjunctiva/sclera: Conjunctivae normal.      Pupils: Pupils are equal, round, and reactive to light.   Neck:      Musculoskeletal: Normal range of motion and neck supple.      Thyroid: No thyroid mass or thyromegaly.   Cardiovascular:      Rate and Rhythm: Regular rhythm.      Pulses: Normal pulses.      Heart sounds: S1 normal and S2 normal. No murmur. No friction rub. No gallop.    Pulmonary:      Effort: Pulmonary effort is normal.      Breath sounds: Normal breath sounds. No wheezing, rhonchi or rales.   Abdominal:      General: Bowel sounds are normal.      Palpations: Abdomen is soft.      Tenderness: There is no abdominal tenderness. There is no guarding.      Hernia: No hernia is present.   Musculoskeletal: Normal range of motion.         General: No deformity.   Lymphadenopathy:      Cervical: No cervical adenopathy.   Skin:     General: Skin is warm and dry.      Findings: No lesion or rash.   Neurological:      General: No focal deficit present.      Mental Status: He is alert and oriented to person, place, and time.      Cranial Nerves: No cranial nerve deficit.      Sensory: No sensory deficit.      Motor: Motor function is intact.      Coordination: Coordination is intact.      Gait: Gait normal.      Deep Tendon Reflexes: Reflexes are normal and symmetric.   Psychiatric:         Attention and Perception: He is attentive.         Mood and Affect: Mood and affect normal.         Speech: Speech normal.         Behavior: Behavior normal.         Thought Content: Thought content normal.           Recent Results  (from the past 672 hour(s))   CBC & Differential    Collection Time: 10/30/20 11:11 AM    Specimen: Blood   Result Value Ref Range    WBC 5.25 3.40 - 10.80 10*3/mm3    RBC 5.36 4.14 - 5.80 10*6/mm3    Hemoglobin 16.3 13.0 - 17.7 g/dL    Hematocrit 48.5 37.5 - 51.0 %    MCV 90.5 79.0 - 97.0 fL    MCH 30.4 26.6 - 33.0 pg    MCHC 33.6 31.5 - 35.7 g/dL    RDW 12.5 12.3 - 15.4 %    Platelets 404 140 - 450 10*3/mm3    Neutrophil Rel % 60.8 42.7 - 76.0 %    Lymphocyte Rel % 24.0 19.6 - 45.3 %    Monocyte Rel % 9.3 5.0 - 12.0 %    Eosinophil Rel % 4.4 0.3 - 6.2 %    Basophil Rel % 1.1 0.0 - 1.5 %    Neutrophils Absolute 3.19 1.70 - 7.00 10*3/mm3    Lymphocytes Absolute 1.26 0.70 - 3.10 10*3/mm3    Monocytes Absolute 0.49 0.10 - 0.90 10*3/mm3    Eosinophils Absolute 0.23 0.00 - 0.40 10*3/mm3    Basophils Absolute 0.06 0.00 - 0.20 10*3/mm3    Immature Granulocyte Rel % 0.4 0.0 - 0.5 %    Immature Grans Absolute 0.02 0.00 - 0.05 10*3/mm3    nRBC 0.0 0.0 - 0.2 /100 WBC   Comprehensive Metabolic Panel    Collection Time: 10/30/20 11:11 AM    Specimen: Blood   Result Value Ref Range    Glucose 81 65 - 99 mg/dL    BUN 12 6 - 20 mg/dL    Creatinine 0.85 0.76 - 1.27 mg/dL    eGFR Non African Am 105 >60 mL/min/1.73    eGFR African Am 127 >60 mL/min/1.73    BUN/Creatinine Ratio 14.1 7.0 - 25.0    Sodium 141 136 - 145 mmol/L    Potassium 4.5 3.5 - 5.2 mmol/L    Chloride 104 98 - 107 mmol/L    Total CO2 25.2 22.0 - 29.0 mmol/L    Calcium 9.9 8.6 - 10.5 mg/dL    Total Protein 7.1 6.0 - 8.5 g/dL    Albumin 4.70 3.50 - 5.20 g/dL    Globulin 2.4 gm/dL    A/G Ratio 2.0 g/dL    Total Bilirubin <0.2 0.0 - 1.2 mg/dL    Alkaline Phosphatase 93 39 - 117 U/L    AST (SGOT) 19 1 - 40 U/L    ALT (SGPT) 23 1 - 41 U/L   Hepatitis C Antibody    Collection Time: 10/30/20 11:11 AM    Specimen: Blood   Result Value Ref Range    Hep C Virus Ab <0.1 0.0 - 0.9 s/co ratio   Lipid Panel With / Chol / HDL Ratio    Collection Time: 10/30/20 11:11 AM    Specimen:  Blood   Result Value Ref Range    Total Cholesterol 245 (H) 0 - 200 mg/dL    Triglycerides 92 0 - 150 mg/dL    HDL Cholesterol 72 (H) 40 - 60 mg/dL    VLDL Cholesterol David 16 5 - 40 mg/dL    LDL Chol Calc (NIH) 157 (H) 0 - 100 mg/dL    Chol/HDL Ratio 3.40    Thyroid Dawes Profile    Collection Time: 10/30/20 11:11 AM    Specimen: Blood   Result Value Ref Range    TSH 0.431 (L) 0.450 - 4.500 uIU/mL   Vitamin D 25 Hydroxy    Collection Time: 10/30/20 11:11 AM    Specimen: Blood   Result Value Ref Range    25 Hydroxy, Vitamin D 34.8 30.0 - 100.0 ng/ml   Thyroxine (T4) Free, Direct, S    Collection Time: 10/30/20 11:11 AM   Result Value Ref Range    Free T4 1.20 0.82 - 1.77 ng/dL   T3 Free    Collection Time: 10/30/20 11:11 AM   Result Value Ref Range    T3, Free 3.4 2.0 - 4.4 pg/mL    Interpretation Comment      Each of these lab results were discussed individually in detail with the patient.      ASSESSMENT AND PLAN    Diagnoses and all orders for this visit:    1. Encounter for wellness examination in adult (Primary)    2. Abnormal TSH  -     TSH; Future  -     T3, Free; Future  -     T4, Free; Future  -     Thyroid Antibodies; Future          Preventative counseling completed including relevant screenings, appropriate vaccinations, healthy nutrition, and appropriate physical activity.        Medications, including side effects, were discussed with the patient. Patient verbalized understanding.  The plan of care was discussed. All questions were answered. Patient verbalized understanding.        Return in about 4 weeks (around 12/4/2020), or - labs., or sooner if needed.

## 2020-11-11 ENCOUNTER — RESULTS ENCOUNTER (OUTPATIENT)
Dept: INTERNAL MEDICINE | Facility: CLINIC | Age: 30
End: 2020-11-11

## 2020-11-11 DIAGNOSIS — R79.89 ABNORMAL TSH: ICD-10-CM

## 2020-11-20 ENCOUNTER — TELEPHONE (OUTPATIENT)
Dept: INTERNAL MEDICINE | Facility: CLINIC | Age: 30
End: 2020-11-20

## 2020-11-20 NOTE — TELEPHONE ENCOUNTER
PATIENT CALLED FOR COVID TEST. HE HAS BEEN IN CONTACT WITH SOMEONE Tuesday, WHO TESTED POSITIVE. HE IS NOT SHOWING SYMPTOMS    PLEASE CALL AND ADVISE [phone]

## 2021-10-22 ENCOUNTER — OFFICE VISIT (OUTPATIENT)
Dept: INTERNAL MEDICINE | Facility: CLINIC | Age: 31
End: 2021-10-22

## 2021-10-22 VITALS
TEMPERATURE: 97.7 F | BODY MASS INDEX: 29.23 KG/M2 | OXYGEN SATURATION: 98 % | RESPIRATION RATE: 20 BRPM | HEART RATE: 84 BPM | HEIGHT: 70 IN | SYSTOLIC BLOOD PRESSURE: 140 MMHG | DIASTOLIC BLOOD PRESSURE: 72 MMHG | WEIGHT: 204.2 LBS

## 2021-10-22 DIAGNOSIS — Z23 NEED FOR IMMUNIZATION AGAINST INFLUENZA: ICD-10-CM

## 2021-10-22 DIAGNOSIS — L30.9 ECZEMA OF LOWER LEG: Primary | ICD-10-CM

## 2021-10-22 PROCEDURE — 90686 IIV4 VACC NO PRSV 0.5 ML IM: CPT | Performed by: NURSE PRACTITIONER

## 2021-10-22 PROCEDURE — 90471 IMMUNIZATION ADMIN: CPT | Performed by: NURSE PRACTITIONER

## 2021-10-22 PROCEDURE — 99213 OFFICE O/P EST LOW 20 MIN: CPT | Performed by: NURSE PRACTITIONER

## 2021-10-22 RX ORDER — DOXYCYCLINE HYCLATE 100 MG/1
100 CAPSULE ORAL 2 TIMES DAILY
Qty: 28 CAPSULE | Refills: 0 | Status: SHIPPED | OUTPATIENT
Start: 2021-10-22

## 2021-10-22 RX ORDER — PIMECROLIMUS 10 MG/G
1 CREAM TOPICAL 2 TIMES DAILY
Qty: 100 G | Refills: 3 | Status: SHIPPED | OUTPATIENT
Start: 2021-10-22

## 2021-10-22 NOTE — PROGRESS NOTES
Chief Complaint   Patient presents with   • Eczema       Sarah Parmar is a 31 y.o. male being seen for an acute visit for chronic eczema to both legs. He started having eczema on both legs 9 years ago after he started working at [employer]. He went to Dermatology, and has a steroid shot where it cleared for 1 week. He uses topica Aveeno eczema. Associated itching. Worse with heat. Denies fever. No known allergies.      History of Present Illness     No Known Allergies    No current outpatient medications on file.    The following portions of the patient's history were reviewed and updated as appropriate: allergies, current medications, past family history, past medical history, past social history, past surgical history and problem list.    Review of Systems   Genitourinary: Negative.    Skin: Positive for rash.   Hematological: Negative for adenopathy. Does not bruise/bleed easily.   Psychiatric/Behavioral: Negative.        Assessment     Physical Exam  Vitals reviewed.   Constitutional:       Appearance: Normal appearance.   Cardiovascular:      Rate and Rhythm: Normal rate and regular rhythm.      Pulses: Normal pulses.      Heart sounds: Normal heart sounds. No murmur heard.  No friction rub.   Pulmonary:      Effort: Pulmonary effort is normal.      Breath sounds: Normal breath sounds.   Skin:     Findings: Rash (Bilateral legs with erythematous, scaly, papular and excoriated rash to medical calves. Left > right. Pruritic in nature. ) present.   Neurological:      General: No focal deficit present.      Mental Status: He is alert and oriented to person, place, and time.   Psychiatric:         Mood and Affect: Mood normal.         Behavior: Behavior normal.         Thought Content: Thought content normal.         Plan         Diagnoses and all orders for this visit:    1. Need for immunization against influenza (Primary)  -     FluLaval/Fluarix/Fluzone >6 Months ([id number])    2. Eczema of lower  leg  -     pimecrolimus (Elidel) 1 % cream; Apply 1 application topically to the appropriate area as directed 2 (Two) Times a Day.  Dispense: 100 g; Refill: 3  -     triamcinolone (KENALOG) 0.1 % ointment; Apply 1 application topically to the appropriate area as directed 2 (Two) Times a Day.  Dispense: 80 g; Refill: 0  -     doxycycline (VIBRAMYCIN) 100 MG capsule; Take 1 capsule by mouth 2 (Two) Times a Day.  Dispense: 28 capsule; Refill: 0        Follow up in 4 weeks

## 2021-10-22 NOTE — PROGRESS NOTES
Injection  Injection performed by Billie Tabor MA. Patient tolerated the procedure well without complications.  10/22/21   Billie Tabor MA

## 2022-01-26 ENCOUNTER — CLINICAL SUPPORT (OUTPATIENT)
Dept: INTERNAL MEDICINE | Facility: CLINIC | Age: 32
End: 2022-01-26

## 2022-01-26 ENCOUNTER — TELEPHONE (OUTPATIENT)
Dept: INTERNAL MEDICINE | Facility: CLINIC | Age: 32
End: 2022-01-26

## 2022-01-26 DIAGNOSIS — Z20.822 EXPOSURE TO COVID-19 VIRUS: Primary | ICD-10-CM

## 2022-01-27 LAB
LABCORP SARS-COV-2, NAA 2 DAY TAT: NORMAL
SARS-COV-2 RNA RESP QL NAA+PROBE: DETECTED

## 2025-04-08 ENCOUNTER — TELEPHONE (OUTPATIENT)
Dept: INTERNAL MEDICINE | Facility: CLINIC | Age: 35
End: 2025-04-08
Payer: COMMERCIAL